# Patient Record
Sex: MALE | Race: WHITE | Employment: OTHER | URBAN - METROPOLITAN AREA
[De-identification: names, ages, dates, MRNs, and addresses within clinical notes are randomized per-mention and may not be internally consistent; named-entity substitution may affect disease eponyms.]

---

## 2019-01-07 ENCOUNTER — OFFICE VISIT (OUTPATIENT)
Dept: ORTHOPEDIC SURGERY | Age: 69
End: 2019-01-07
Payer: COMMERCIAL

## 2019-01-07 VITALS
HEART RATE: 101 BPM | DIASTOLIC BLOOD PRESSURE: 93 MMHG | HEIGHT: 65 IN | WEIGHT: 164 LBS | SYSTOLIC BLOOD PRESSURE: 176 MMHG | BODY MASS INDEX: 27.32 KG/M2

## 2019-01-07 DIAGNOSIS — M12.811 ROTATOR CUFF ARTHROPATHY OF RIGHT SHOULDER: ICD-10-CM

## 2019-01-07 DIAGNOSIS — S46.211D RUPTURE OF RIGHT BICEPS TENDON, SUBSEQUENT ENCOUNTER: Primary | ICD-10-CM

## 2019-01-07 PROCEDURE — 99214 OFFICE O/P EST MOD 30 MIN: CPT | Performed by: ORTHOPAEDIC SURGERY

## 2019-01-07 PROCEDURE — 20611 DRAIN/INJ JOINT/BURSA W/US: CPT | Performed by: ORTHOPAEDIC SURGERY

## 2019-01-07 RX ORDER — MECLIZINE HYDROCHLORIDE CHEWABLE TABLETS 25 MG/1
25 TABLET, CHEWABLE ORAL
COMMUNITY
Start: 2017-07-26

## 2019-01-07 RX ORDER — METFORMIN HYDROCHLORIDE 500 MG/1
500 TABLET, EXTENDED RELEASE ORAL
COMMUNITY
Start: 2018-01-30

## 2019-01-07 RX ORDER — AMITRIPTYLINE HYDROCHLORIDE 50 MG/1
50 TABLET, FILM COATED ORAL
COMMUNITY
Start: 2018-04-02

## 2019-01-07 RX ORDER — LANSOPRAZOLE 15 MG/1
15 CAPSULE, DELAYED RELEASE ORAL
COMMUNITY
Start: 2017-05-08

## 2019-01-07 RX ORDER — CYCLOBENZAPRINE HCL 10 MG
10 TABLET ORAL
COMMUNITY
Start: 2018-09-24

## 2019-01-07 RX ORDER — SIMVASTATIN 40 MG
40 TABLET ORAL
COMMUNITY
Start: 2018-05-01

## 2019-01-07 RX ORDER — LIDOCAINE HYDROCHLORIDE 10 MG/ML
4 INJECTION, SOLUTION INFILTRATION; PERINEURAL ONCE
Status: COMPLETED | OUTPATIENT
Start: 2019-01-07 | End: 2019-01-08

## 2019-01-07 RX ORDER — ISOSORBIDE MONONITRATE 30 MG/1
30 TABLET, EXTENDED RELEASE ORAL
COMMUNITY
Start: 2018-03-25

## 2019-01-07 RX ORDER — GLIMEPIRIDE 2 MG/1
2 TABLET ORAL
COMMUNITY
Start: 2018-10-22

## 2019-01-07 RX ORDER — IPRATROPIUM BROMIDE 21 UG/1
2 SPRAY, METERED NASAL
COMMUNITY
Start: 2018-03-19

## 2019-01-07 RX ORDER — FLUTICASONE PROPIONATE 50 MCG
50 SPRAY, SUSPENSION (ML) NASAL
COMMUNITY
Start: 2017-07-14

## 2019-01-07 RX ORDER — BETAMETHASONE DIPROPIONATE 0.5 MG/G
0.5 CREAM TOPICAL
COMMUNITY
Start: 2018-01-15

## 2019-01-07 RX ORDER — ASPIRIN 81 MG/1
81 TABLET, CHEWABLE ORAL
COMMUNITY

## 2019-01-07 RX ORDER — HYDROCODONE BITARTRATE AND ACETAMINOPHEN 5; 325 MG/1; MG/1
1 TABLET ORAL
COMMUNITY
Start: 2018-05-16

## 2019-01-07 RX ORDER — METHYLPREDNISOLONE ACETATE 40 MG/ML
40 INJECTION, SUSPENSION INTRA-ARTICULAR; INTRALESIONAL; INTRAMUSCULAR; SOFT TISSUE ONCE
Status: COMPLETED | OUTPATIENT
Start: 2019-01-07 | End: 2019-01-08

## 2019-01-07 RX ORDER — ROPINIROLE 3 MG/1
6 TABLET, FILM COATED ORAL
COMMUNITY
Start: 2018-09-20

## 2019-01-07 RX ORDER — ZOLMITRIPTAN 5 MG/1
5 TABLET, FILM COATED ORAL
COMMUNITY
Start: 2018-07-26

## 2019-01-07 RX ORDER — TRIAMCINOLONE ACETONIDE 1 MG/G
0.1 CREAM TOPICAL
COMMUNITY
Start: 2016-12-21

## 2019-01-07 RX ORDER — SUMATRIPTAN 100 MG/1
100 TABLET, FILM COATED ORAL
COMMUNITY
Start: 2018-08-09

## 2019-01-07 RX ORDER — CLOBETASOL PROPIONATE 0.5 MG/G
0.5 CREAM TOPICAL
COMMUNITY

## 2019-01-07 ASSESSMENT — ENCOUNTER SYMPTOMS
DIARRHEA: 0
RESPIRATORY NEGATIVE: 1
SHORTNESS OF BREATH: 0
ABDOMINAL PAIN: 0
ABDOMINAL DISTENTION: 0
VOMITING: 0
CONSTIPATION: 0
NAUSEA: 0
APNEA: 0
CHEST TIGHTNESS: 0
COLOR CHANGE: 0
COUGH: 0

## 2019-01-08 RX ADMIN — LIDOCAINE HYDROCHLORIDE 4 ML: 10 INJECTION, SOLUTION INFILTRATION; PERINEURAL at 09:58

## 2019-01-08 RX ADMIN — METHYLPREDNISOLONE ACETATE 40 MG: 40 INJECTION, SUSPENSION INTRA-ARTICULAR; INTRALESIONAL; INTRAMUSCULAR; SOFT TISSUE at 09:59

## 2019-01-15 ENCOUNTER — TELEPHONE (OUTPATIENT)
Dept: ORTHOPEDIC SURGERY | Age: 69
End: 2019-01-15

## 2019-04-08 ENCOUNTER — OFFICE VISIT (OUTPATIENT)
Dept: ORTHOPEDIC SURGERY | Age: 69
End: 2019-04-08
Payer: COMMERCIAL

## 2019-04-08 VITALS
HEART RATE: 97 BPM | HEIGHT: 65 IN | BODY MASS INDEX: 27.63 KG/M2 | DIASTOLIC BLOOD PRESSURE: 85 MMHG | WEIGHT: 165.8 LBS | SYSTOLIC BLOOD PRESSURE: 163 MMHG

## 2019-04-08 DIAGNOSIS — M12.811 ROTATOR CUFF ARTHROPATHY OF RIGHT SHOULDER: Primary | ICD-10-CM

## 2019-04-08 PROCEDURE — 99212 OFFICE O/P EST SF 10 MIN: CPT | Performed by: ORTHOPAEDIC SURGERY

## 2019-04-08 ASSESSMENT — ENCOUNTER SYMPTOMS
ABDOMINAL DISTENTION: 0
SHORTNESS OF BREATH: 0
CONSTIPATION: 0
COLOR CHANGE: 0
NAUSEA: 0
CHEST TIGHTNESS: 0
DIARRHEA: 0
APNEA: 0
VOMITING: 0
ABDOMINAL PAIN: 0
COUGH: 0

## 2019-04-08 NOTE — PROGRESS NOTES
[facility]  [address] [address]  Dept: [phone]  Dept Fax: [phone]          Right Shoulder - Follow Up     Chief Complaint:     Chief Complaint   Patient presents with    Follow-up     follow up on rt shoulder pain. Rates pain today as a 2. Taking tylenol or advil and sometimes will need a norco when pain is bad. Currently going to PT twice weekly     HPI:     Brenda Louie is a 77y.o. year old right hand dominant male that has had pain in the right shoulder for A couple of years. Patient states his pain has not really located in his shoulder is more across the top of the traps and down his arm. Occupation . As far as any trauma to his shoulder. Patient indicates an injury where he went to put the key and admission and he felt a sharp pain in his shoulder. The pain is worse at night when doing overhead activities. Weakness of the arm has been noted. The pain restricted activity such as lifting. The pain does not seem to improve with time. The patient has tried tramadol, hydrocodone Norco Tylenol and Advil. He states medication as prescribed. As for his back/migraines. Physical therapy has been done for 6- 8 weeks and that did not provide relief. Cortisone injection has been done on 2/26/2018 and at the subacromial space and that injection did not provide relief. MRI has been done. Surgery is not been performed. Neck pain has been present. Opposite shoulder is fine. At his last visit, we discussed that his pain was most likely coming from his neck. Dr. Lizy Navarro told him that his neck needs to get fixed before we should work on the shoulder because a majority of his pain is coming from his neck. He is going to physical therapy for his shoulder through us and for his neck which is prescribed by his PCP, Dr. Yousif Thornton. The physical therapy for his neck has helped with the neck pain. Patient also tried a glenohumeral joint cortisone injection with mild pain relief on 01/07/2019. He has also seen Dr. Candi Dupree regarding his neck and he sent him to pain management to try injections. Patient states that PT has helped him immensely and he has very little pain in the shoulder. Review of Systems   Constitutional: Positive for activity change. Negative for appetite change. Respiratory: Negative for apnea, cough, chest tightness and shortness of breath. Cardiovascular: Negative for chest pain, palpitations and leg swelling. Gastrointestinal: Negative for abdominal distention, abdominal pain, constipation, diarrhea, nausea and vomiting. Genitourinary: Negative for difficulty urinating, dysuria and hematuria. Musculoskeletal: Positive for arthralgias. Negative for gait problem, joint swelling and myalgias. Skin: Negative for color change and rash. Neurological: Negative for dizziness, weakness, numbness and headaches. Psychiatric/Behavioral: Negative for sleep disturbance. Past Medical History:    Past Medical History:   Diagnosis Date    Diabetes mellitus ([facility] [address]     Migraines      Past Surgical History:    No past surgical history on file. Current Medications:   Current Outpatient Medications   Medication Sig Dispense Refill    amitriptyline (ELAVIL) 50 MG tablet Take 50 mg by mouth      aspirin 81 MG chewable tablet Take 81 mg by mouth      augmented betamethasone dipropionate (DIPROLENE-AF) 0.05 % cream Apply 0.5 Tubes topically      ciclopirox (LOPROX) 0.77 % cream 0.77 Tubes      clobetasol (TEMOVATE) 0.05 % cream Apply 0.5 Tubes topically      cyclobenzaprine (FLEXERIL) 10 MG tablet Take 10 mg by mouth      fluticasone (FLONASE) 50 MCG/ACT nasal spray 50 sprays      glimepiride (AMARYL) 2 MG tablet Take 2 mg by mouth      HYDROcodone-acetaminophen (NORCO) 5-325 MG per tablet Take 1 tablet by mouth. .      ipratropium (ATROVENT) 0.03 % nasal spray 2 sprays by Nasal route      isosorbide mononitrate (IMDUR) 30 MG extended release tablet 30 mg      lansoprazole (PREVACID) 15 MG delayed release capsule Take 15 mg by mouth      meclizine (ANTIVERT) 25 MG CHEW Take 25 mg by mouth      metFORMIN (GLUCOPHAGE-XR) 500 MG extended release tablet 500 mg      Onabotulinumtoxin A (BOTOX) 200 units injection 200 Units      rOPINIRole (REQUIP) 3 MG tablet Take 6 mg by mouth      simvastatin (ZOCOR) 40 MG tablet Take 40 mg by mouth      SUMAtriptan (IMITREX) 100 MG tablet 100 mg      triamcinolone (KENALOG) 0.1 % cream Apply 0.1 Tubes topically      ZOLMitriptan (ZOMIG) 5 MG tablet 5 mg       No current facility-administered medications for this visit. Allergies:    Docusate sodium; Niacin; Rosuvastatin;  Theophylline; and Penicillins    Social History:   Social History     Socioeconomic History    Marital status:      Spouse name: None    Number of children: None    Years of education: None    Highest education level: None   Occupational History    None   Social Needs    Financial resource strain: None    Food insecurity:     Worry: None     Inability: None    Transportation needs:     Medical: None     Non-medical: None   Tobacco Use    Smoking status: Never Smoker    Smokeless tobacco: Never Used   Substance and Sexual Activity    Alcohol use: No    Drug use: No    Sexual activity: None   Lifestyle    Physical activity:     Days per week: None     Minutes per session: None    Stress: None   Relationships    Social connections:     Talks on phone: None     Gets together: None     Attends [employer] service: None     Active member of club or organization: None     Attends meetings of clubs or organizations: None     Relationship status: None    Intimate partner violence:     Fear of current or ex partner: None     Emotionally abused: None     Physically abused: None     Forced sexual activity: None   Other Topics Concern    None   Social History Narrative    None     Family History:  No family history on file. I have reviewed the CC, HPI, ROS, PMH, FHX, Social History, and if not present in this note, I have reviewed in the patient's chart. I agree with the documentation provided by other staff and have reviewed their documentation prior to providing my signature indicating agreement. Vitals:   BP (!) 163/85 (Site: Left Upper Arm, Position: Sitting, Cuff Size: Medium Adult)   Pulse 97   Ht 5' 5\" (1.651 m)   Wt 165 lb 12.8 oz (75.2 kg)   BMI 27.59 kg/m²  Body mass index is 27.59 kg/m². Physical Examination:     Orthopedics:    GENERAL: Alert and oriented X3 in no acute distress. SKIN: Intact without lesions or ulcerations. NEURO: Musculoskeletal and axillary nerves intact to sensory and motor testing. VASC: Capillary refill is less than 3 seconds. Right Shoulder Exam    GEN:  Alert and oriented X 3, in no acute distress. SKIN:  Intact without rashes, lesions, or ulcerations. Incisions are well healed. NEURO:  Musculoskeletal ans axillary nerves intact to sensory and motor testing. VASC:  Cap refill less than than 3 secs. Negative Adson's test, Negative Skylar's test.  ROM: 120 degrees of forward elevation, 30 degrees of external rotation in neutral, 85 degrees of external rotation in abduction, internal rotation to L3. STRENGTH: Supraspinatus 4+/5, external rotators 4+/5. MUSC: No atrophy, negative subscap lift off or belly press test.  IMP:  mild Neer's sign, mild Hawkin's sign, no painful arc, no pain with cross body abduction. PALP: no pain over anterolateral acromion, no pain over AC joint, no pain over traps/rhomboids. Assessment:     1. Rotator cuff arthropathy of right shoulder        Procedures:    Procedure: no    Radiology:   No results found. Plan:   Treatment : We discussed the etiologies and natural histories of bicep tendon tear and rotator cuff tear in the right shoulder.  We discussed the various treatment alternatives including anti-inflammatory medications, physical therapy, injections, further imaging studies and as a last result surgery. During today's visit, I explained to the patient that since he had good pain relief from physical therapy, I instructed him to continue doing his exercises for the shoulder if he does have any pain that is re-created from his activities of daily living. So at this time, the patient has opted to continue to do the stretches and exercises that they were instructed to do on their own to maintain their strength and range of motion. If the patient ever feels they are unable to do the stretches or exercises on their own or they feel they are losing strength and range of motion, they may contact our office if they would like to go back to physical therapy. Patient should return to the clinic as needed. The patient will call the office immediately with any problems. No orders of the defined types were placed in this encounter. No orders of the defined types were placed in this encounter. Hosea ALMANZA am scribing for and in the presence of Darrel JACKSON. 4/10/2019  10:57 AM    I, Darrel Moyer D.O. ,  have personally seen this patient, reviewed the chart including history, and imaging if done. I personally  performed the physical exam and obtained any needed additional history. I placed orders, performed or supervised procedures and developed the treatment plan.     Electronically signed by Lisseth Bryant DO, on 4/10/2019 at 10:57 AM